# Patient Record
(demographics unavailable — no encounter records)

---

## 2025-03-10 NOTE — END OF VISIT
[FreeTextEntry3] : All medical record entries made by my scribe were at my, Dr. Jonathan Almonte, direction and personally dictated by me. I have reviewed the chart and agree that the record accurately reflects my personal performance of the history, physical exam, assessment and plan. I have also personally directed, reviewed, and agreed with the chart.

## 2025-03-10 NOTE — HISTORY OF PRESENT ILLNESS
[FreeTextEntry1] : 70-year-old male presents today with a history of PVCs s/p PVC ablation x2. Patient's last ablation was 02/2025. Patient had no further PVC after the ablation and states that his heart feels "well and calm." His groin wound is well-healed.  EKG (03/07/2025): Sinus rhythm at 72 bpm with LBBB and first degree AV block.  Cardiac MRI (02/2024): decreased LV function with EF of 43%. No focal LGE.

## 2025-03-10 NOTE — ADDENDUM
[FreeTextEntry1] : I, Becky Maciel, assisted in documentation on 03/07/2025 acting as a scribe for Dr. Jonathan Almonte.

## 2025-03-10 NOTE — ASSESSMENT
[FreeTextEntry1] : PVC: - Patient showed no PVC on EKG today. - Obtain event monitor to evaluate PVC burden.  HTN: - Continue Olmesartan 40mg qd. - Continue to monitor.

## 2025-05-22 NOTE — HISTORY OF PRESENT ILLNESS
[de-identified] : Patient presents today c/o bilateral ear pain and pressure ,   He states the left ear is usually more painful.  He denies any hearing loss.

## 2025-05-22 NOTE — REASON FOR VISIT
[Initial Evaluation] : an initial evaluation for [FreeTextEntry2] : bilateral ear pain and pressure ,

## 2025-05-30 NOTE — PHYSICAL EXAM
[Well Developed] : well developed [Well Nourished] : well nourished [No Acute Distress] : no acute distress [Normal Conjunctiva] : normal conjunctiva [Normal Venous Pressure] : normal venous pressure [No Carotid Bruit] : no carotid bruit [Normal S1, S2] : normal S1, S2 [No Murmur] : no murmur [No Rub] : no rub [No Gallop] : no gallop [Clear Lung Fields] : clear lung fields [Good Air Entry] : good air entry [No Respiratory Distress] : no respiratory distress  [Soft] : abdomen soft [Non Tender] : non-tender [No Masses/organomegaly] : no masses/organomegaly [Normal Bowel Sounds] : normal bowel sounds [Normal Gait] : normal gait [No Edema] : no edema [No Cyanosis] : no cyanosis [No Clubbing] : no clubbing [No Varicosities] : no varicosities [No Rash] : no rash [No Skin Lesions] : no skin lesions [Moves all extremities] : moves all extremities [No Focal Deficits] : no focal deficits [Normal Speech] : normal speech [Alert and Oriented] : alert and oriented [Normal memory] : normal memory [Heart Rate And Rhythm] : heart rate and rhythm were normal [Heart Sounds] : normal S1 and S2 [Murmurs] : no murmurs present High Dose Vitamin A Counseling: Side effects reviewed, pt to contact office should one occur.

## 2025-05-30 NOTE — PHYSICAL EXAM
[Well Developed] : well developed [Well Nourished] : well nourished [No Acute Distress] : no acute distress [Normal Conjunctiva] : normal conjunctiva [Normal Venous Pressure] : normal venous pressure [No Carotid Bruit] : no carotid bruit [Normal S1, S2] : normal S1, S2 [No Murmur] : no murmur [No Rub] : no rub [No Gallop] : no gallop [Clear Lung Fields] : clear lung fields [Good Air Entry] : good air entry [No Respiratory Distress] : no respiratory distress  [Soft] : abdomen soft [Non Tender] : non-tender [No Masses/organomegaly] : no masses/organomegaly [Normal Bowel Sounds] : normal bowel sounds [Normal Gait] : normal gait [No Edema] : no edema [No Cyanosis] : no cyanosis [No Clubbing] : no clubbing [No Varicosities] : no varicosities [No Rash] : no rash [No Skin Lesions] : no skin lesions [Moves all extremities] : moves all extremities [No Focal Deficits] : no focal deficits [Normal Speech] : normal speech [Alert and Oriented] : alert and oriented [Normal memory] : normal memory [Heart Rate And Rhythm] : heart rate and rhythm were normal [Heart Sounds] : normal S1 and S2 [Murmurs] : no murmurs present

## 2025-06-02 NOTE — PROCEDURE
[No] : not [ICD] : Implantable cardioverter-defibrillator [Programmed for Longevity] : output reprogrammed for improved battery longevity [de-identified] : [employer]

## 2025-06-02 NOTE — ADDENDUM
[FreeTextEntry1] : Taylor GLYNN assisted in documentation on 06/02/2025   acting as a scribe for Dr. Jonathan Almonte.

## 2025-06-02 NOTE — PROCEDURE
[No] : not [ICD] : Implantable cardioverter-defibrillator [Programmed for Longevity] : output reprogrammed for improved battery longevity [de-identified] : [employer]

## 2025-06-02 NOTE — HISTORY OF PRESENT ILLNESS
[FreeTextEntry1] : 70-year-old male presents today with a history of PVCs s/p PVC ablation x2. Patient's last ablation was 02/2025. Patient had no further PVC after the ablation and states that his heart feels "well and calm." His groin wound is well-healed.  05/30/2025: Patient comes to the office today for routine follow-up. Patient is doing well. currently in normal sinus rhythm. No significant events on device.      Echo (05/2025): EF of 25%, dilated LV, severe LAE, no major valvular disease.   EKG (03/07/2025): Sinus rhythm at 72 bpm with LBBB and first-degree AV block.  Cardiac MRI (02/2024): decreased LV function with EF of 43%. No focal LGE.

## 2025-06-02 NOTE — PROCEDURE
[No] : not [ICD] : Implantable cardioverter-defibrillator [Programmed for Longevity] : output reprogrammed for improved battery longevity [de-identified] : [employer]

## 2025-06-02 NOTE — ASSESSMENT
[FreeTextEntry1] : CHF  - Patient found to have new onset cardiomyopathy with VT requiring defibrillator.   - Normal functioning DC-ICD.   - Start Metoprolol 25 mg qd.   - Start Olmesartan 40 mg qd  .  - No further episodes of VT.   - Recommend repeating echocardiogram in 3 months to evaluate patient's LV function.     HTN: - Continue Olmesartan 40mg qd. - Continue to monitor.